# Patient Record
Sex: FEMALE | Race: WHITE | Employment: FULL TIME | URBAN - METROPOLITAN AREA
[De-identification: names, ages, dates, MRNs, and addresses within clinical notes are randomized per-mention and may not be internally consistent; named-entity substitution may affect disease eponyms.]

---

## 2020-02-09 LAB
ABO, EXTERNAL RESULT: NORMAL
HEP B, EXTERNAL RESULT: NEGATIVE
HEPATITIS C ANTIBODY, EXTERNAL RESULT: NEGATIVE
HIV, EXTERNAL RESULT: NORMAL
RPR, EXTERNAL RESULT: NORMAL
RUBELLA TITER, EXTERNAL RESULT: NORMAL

## 2020-06-09 ENCOUNTER — HOSPITAL ENCOUNTER (OUTPATIENT)
Age: 30
Discharge: HOME | End: 2020-06-09
Attending: OBSTETRICS & GYNECOLOGY | Admitting: OBSTETRICS & GYNECOLOGY
Payer: COMMERCIAL

## 2020-06-09 VITALS
TEMPERATURE: 99 F | RESPIRATION RATE: 18 BRPM | SYSTOLIC BLOOD PRESSURE: 125 MMHG | HEART RATE: 88 BPM | DIASTOLIC BLOOD PRESSURE: 67 MMHG

## 2020-06-09 LAB — RHIG LOT NUMBER: NORMAL

## 2020-06-09 PROCEDURE — 96372 THER/PROPH/DIAG INJ SC/IM: CPT

## 2020-06-09 PROCEDURE — 6360000002 HC RX W HCPCS: Performed by: OBSTETRICS & GYNECOLOGY

## 2020-06-09 RX ADMIN — HUMAN RHO(D) IMMUNE GLOBULIN 300 MCG: 300 INJECTION, SOLUTION INTRAMUSCULAR at 17:09

## 2020-06-09 NOTE — FLOWSHEET NOTE
Presents to triage for rhogam injection. Blood type results obtained from lab ghislaine and sent to blood bank.

## 2020-07-30 LAB
C. TRACHOMATIS, EXTERNAL RESULT: NEGATIVE
GBS, EXTERNAL RESULT: NEGATIVE
N. GONORRHOEAE, EXTERNAL RESULT: NEGATIVE

## 2020-08-13 ENCOUNTER — ANESTHESIA EVENT (OUTPATIENT)
Dept: LABOR AND DELIVERY | Age: 30
End: 2020-08-13
Payer: COMMERCIAL

## 2020-08-13 ENCOUNTER — ANESTHESIA (OUTPATIENT)
Dept: LABOR AND DELIVERY | Age: 30
End: 2020-08-13
Payer: COMMERCIAL

## 2020-08-13 ENCOUNTER — HOSPITAL ENCOUNTER (INPATIENT)
Age: 30
LOS: 3 days | Discharge: HOME | End: 2020-08-16
Attending: OBSTETRICS & GYNECOLOGY | Admitting: OBSTETRICS & GYNECOLOGY
Payer: COMMERCIAL

## 2020-08-13 VITALS
RESPIRATION RATE: 12 BRPM | SYSTOLIC BLOOD PRESSURE: 105 MMHG | OXYGEN SATURATION: 96 % | DIASTOLIC BLOOD PRESSURE: 58 MMHG

## 2020-08-13 PROBLEM — O41.00X0 OLIGOHYDRAMNIOS: Status: ACTIVE | Noted: 2020-08-13

## 2020-08-13 PROBLEM — O41.00X0 OLIGOHYDRAMNIOS: Status: RESOLVED | Noted: 2020-08-13 | Resolved: 2020-08-13

## 2020-08-13 PROBLEM — Z34.90 SUPERVISION OF REPEAT TERM PREGNANCY: Status: ACTIVE | Noted: 2020-08-13

## 2020-08-13 LAB
ABO/RH: NORMAL
AMPHETAMINE SCREEN, URINE: NORMAL
ANTIBODY IDENTIFICATION: NORMAL
ANTIBODY SCREEN: NORMAL
BARBITURATE SCREEN URINE: NORMAL
BENZODIAZEPINE SCREEN, URINE: NORMAL
BUPRENORPHINE URINE: NORMAL
CANNABINOID SCREEN URINE: NORMAL
COCAINE METABOLITE SCREEN URINE: NORMAL
DAT IGG CAPTURE: NORMAL
HCT VFR BLD CALC: 37.9 % (ref 36–48)
HEMOGLOBIN: 12.9 G/DL (ref 12–16)
Lab: NORMAL
MCH RBC QN AUTO: 30.5 PG (ref 26–34)
MCHC RBC AUTO-ENTMCNC: 33.9 G/DL (ref 31–36)
MCV RBC AUTO: 90.1 FL (ref 80–100)
METHADONE SCREEN, URINE: NORMAL
OPIATE SCREEN URINE: NORMAL
OXYCODONE URINE: NORMAL
PDW BLD-RTO: 14 % (ref 12.4–15.4)
PH UA: 5
PHENCYCLIDINE SCREEN URINE: NORMAL
PLATELET # BLD: 191 K/UL (ref 135–450)
PMV BLD AUTO: 8.6 FL (ref 5–10.5)
PROPOXYPHENE SCREEN: NORMAL
RBC # BLD: 4.21 M/UL (ref 4–5.2)
WBC # BLD: 9.1 K/UL (ref 4–11)

## 2020-08-13 PROCEDURE — 2580000003 HC RX 258: Performed by: OBSTETRICS & GYNECOLOGY

## 2020-08-13 PROCEDURE — 1200000000 HC SEMI PRIVATE

## 2020-08-13 PROCEDURE — 6360000002 HC RX W HCPCS: Performed by: OBSTETRICS & GYNECOLOGY

## 2020-08-13 PROCEDURE — 3700000000 HC ANESTHESIA ATTENDED CARE: Performed by: OBSTETRICS & GYNECOLOGY

## 2020-08-13 PROCEDURE — 3700000001 HC ADD 15 MINUTES (ANESTHESIA): Performed by: OBSTETRICS & GYNECOLOGY

## 2020-08-13 PROCEDURE — 86780 TREPONEMA PALLIDUM: CPT

## 2020-08-13 PROCEDURE — 6370000000 HC RX 637 (ALT 250 FOR IP): Performed by: OBSTETRICS & GYNECOLOGY

## 2020-08-13 PROCEDURE — 2500000003 HC RX 250 WO HCPCS: Performed by: OBSTETRICS & GYNECOLOGY

## 2020-08-13 PROCEDURE — 3609079900 HC CESAREAN SECTION: Performed by: OBSTETRICS & GYNECOLOGY

## 2020-08-13 PROCEDURE — 2500000003 HC RX 250 WO HCPCS: Performed by: NURSE ANESTHETIST, CERTIFIED REGISTERED

## 2020-08-13 PROCEDURE — 86850 RBC ANTIBODY SCREEN: CPT

## 2020-08-13 PROCEDURE — 85027 COMPLETE CBC AUTOMATED: CPT

## 2020-08-13 PROCEDURE — 2709999900 HC NON-CHARGEABLE SUPPLY: Performed by: OBSTETRICS & GYNECOLOGY

## 2020-08-13 PROCEDURE — 86900 BLOOD TYPING SEROLOGIC ABO: CPT

## 2020-08-13 PROCEDURE — 86880 COOMBS TEST DIRECT: CPT

## 2020-08-13 PROCEDURE — 86901 BLOOD TYPING SEROLOGIC RH(D): CPT

## 2020-08-13 PROCEDURE — 80307 DRUG TEST PRSMV CHEM ANLYZR: CPT

## 2020-08-13 PROCEDURE — 86870 RBC ANTIBODY IDENTIFICATION: CPT

## 2020-08-13 PROCEDURE — 6360000002 HC RX W HCPCS: Performed by: NURSE ANESTHETIST, CERTIFIED REGISTERED

## 2020-08-13 RX ORDER — DEXAMETHASONE SODIUM PHOSPHATE 4 MG/ML
INJECTION, SOLUTION INTRA-ARTICULAR; INTRALESIONAL; INTRAMUSCULAR; INTRAVENOUS; SOFT TISSUE PRN
Status: DISCONTINUED | OUTPATIENT
Start: 2020-08-13 | End: 2020-08-13 | Stop reason: SDUPTHER

## 2020-08-13 RX ORDER — TRISODIUM CITRATE DIHYDRATE AND CITRIC ACID MONOHYDRATE 500; 334 MG/5ML; MG/5ML
30 SOLUTION ORAL ONCE
Status: COMPLETED | OUTPATIENT
Start: 2020-08-13 | End: 2020-08-13

## 2020-08-13 RX ORDER — OXYCODONE HYDROCHLORIDE 5 MG/1
10 TABLET ORAL EVERY 4 HOURS PRN
Status: DISCONTINUED | OUTPATIENT
Start: 2020-08-13 | End: 2020-08-16 | Stop reason: HOSPADM

## 2020-08-13 RX ORDER — PHENYLEPHRINE HCL IN 0.9% NACL 1 MG/10 ML
SYRINGE (ML) INTRAVENOUS PRN
Status: DISCONTINUED | OUTPATIENT
Start: 2020-08-13 | End: 2020-08-13 | Stop reason: SDUPTHER

## 2020-08-13 RX ORDER — SODIUM CHLORIDE, SODIUM LACTATE, POTASSIUM CHLORIDE, CALCIUM CHLORIDE 600; 310; 30; 20 MG/100ML; MG/100ML; MG/100ML; MG/100ML
INJECTION, SOLUTION INTRAVENOUS CONTINUOUS
Status: DISCONTINUED | OUTPATIENT
Start: 2020-08-13 | End: 2020-08-13

## 2020-08-13 RX ORDER — SODIUM CHLORIDE 0.9 % (FLUSH) 0.9 %
10 SYRINGE (ML) INJECTION EVERY 12 HOURS SCHEDULED
Status: DISCONTINUED | OUTPATIENT
Start: 2020-08-13 | End: 2020-08-16 | Stop reason: HOSPADM

## 2020-08-13 RX ORDER — SODIUM CHLORIDE 0.9 % (FLUSH) 0.9 %
10 SYRINGE (ML) INJECTION PRN
Status: DISCONTINUED | OUTPATIENT
Start: 2020-08-13 | End: 2020-08-16 | Stop reason: HOSPADM

## 2020-08-13 RX ORDER — EPHEDRINE SULFATE/0.9% NACL/PF 50 MG/5 ML
SYRINGE (ML) INTRAVENOUS PRN
Status: DISCONTINUED | OUTPATIENT
Start: 2020-08-13 | End: 2020-08-13 | Stop reason: SDUPTHER

## 2020-08-13 RX ORDER — MORPHINE SULFATE 1 MG/ML
INJECTION, SOLUTION EPIDURAL; INTRATHECAL; INTRAVENOUS PRN
Status: DISCONTINUED | OUTPATIENT
Start: 2020-08-13 | End: 2020-08-13 | Stop reason: SDUPTHER

## 2020-08-13 RX ORDER — MODIFIED LANOLIN
OINTMENT (GRAM) TOPICAL
Status: DISCONTINUED | OUTPATIENT
Start: 2020-08-13 | End: 2020-08-16 | Stop reason: HOSPADM

## 2020-08-13 RX ORDER — SODIUM CHLORIDE, SODIUM LACTATE, POTASSIUM CHLORIDE, CALCIUM CHLORIDE 600; 310; 30; 20 MG/100ML; MG/100ML; MG/100ML; MG/100ML
INJECTION, SOLUTION INTRAVENOUS CONTINUOUS
Status: DISCONTINUED | OUTPATIENT
Start: 2020-08-13 | End: 2020-08-16 | Stop reason: HOSPADM

## 2020-08-13 RX ORDER — GLYCOPYRROLATE 0.2 MG/ML
INJECTION INTRAMUSCULAR; INTRAVENOUS PRN
Status: DISCONTINUED | OUTPATIENT
Start: 2020-08-13 | End: 2020-08-13 | Stop reason: SDUPTHER

## 2020-08-13 RX ORDER — DOCUSATE SODIUM 100 MG/1
100 CAPSULE, LIQUID FILLED ORAL 2 TIMES DAILY
COMMUNITY

## 2020-08-13 RX ORDER — ONDANSETRON 2 MG/ML
4 INJECTION INTRAMUSCULAR; INTRAVENOUS EVERY 6 HOURS PRN
Status: DISCONTINUED | OUTPATIENT
Start: 2020-08-13 | End: 2020-08-16 | Stop reason: HOSPADM

## 2020-08-13 RX ORDER — SODIUM CHLORIDE 0.9 % (FLUSH) 0.9 %
10 SYRINGE (ML) INJECTION PRN
Status: DISCONTINUED | OUTPATIENT
Start: 2020-08-13 | End: 2020-08-13

## 2020-08-13 RX ORDER — NALOXONE HYDROCHLORIDE 0.4 MG/ML
0.4 INJECTION, SOLUTION INTRAMUSCULAR; INTRAVENOUS; SUBCUTANEOUS PRN
Status: DISCONTINUED | OUTPATIENT
Start: 2020-08-13 | End: 2020-08-16 | Stop reason: HOSPADM

## 2020-08-13 RX ORDER — OXYCODONE HYDROCHLORIDE 5 MG/1
5 TABLET ORAL EVERY 4 HOURS PRN
Status: DISCONTINUED | OUTPATIENT
Start: 2020-08-13 | End: 2020-08-16 | Stop reason: HOSPADM

## 2020-08-13 RX ORDER — IBUPROFEN 800 MG/1
800 TABLET ORAL EVERY 8 HOURS SCHEDULED
Status: DISCONTINUED | OUTPATIENT
Start: 2020-08-13 | End: 2020-08-16 | Stop reason: HOSPADM

## 2020-08-13 RX ORDER — MIDAZOLAM HYDROCHLORIDE 1 MG/ML
INJECTION INTRAMUSCULAR; INTRAVENOUS PRN
Status: DISCONTINUED | OUTPATIENT
Start: 2020-08-13 | End: 2020-08-13 | Stop reason: SDUPTHER

## 2020-08-13 RX ORDER — ACETAMINOPHEN 500 MG
1000 TABLET ORAL ONCE
Status: COMPLETED | OUTPATIENT
Start: 2020-08-13 | End: 2020-08-13

## 2020-08-13 RX ORDER — METOCLOPRAMIDE HYDROCHLORIDE 5 MG/ML
10 INJECTION INTRAMUSCULAR; INTRAVENOUS ONCE
Status: COMPLETED | OUTPATIENT
Start: 2020-08-13 | End: 2020-08-13

## 2020-08-13 RX ORDER — DOCUSATE SODIUM 100 MG/1
100 CAPSULE, LIQUID FILLED ORAL 2 TIMES DAILY
Status: DISCONTINUED | OUTPATIENT
Start: 2020-08-13 | End: 2020-08-16 | Stop reason: HOSPADM

## 2020-08-13 RX ORDER — SODIUM CHLORIDE 0.9 % (FLUSH) 0.9 %
10 SYRINGE (ML) INJECTION EVERY 12 HOURS SCHEDULED
Status: DISCONTINUED | OUTPATIENT
Start: 2020-08-13 | End: 2020-08-13

## 2020-08-13 RX ORDER — ACETAMINOPHEN 500 MG
1000 TABLET ORAL EVERY 8 HOURS SCHEDULED
Status: DISCONTINUED | OUTPATIENT
Start: 2020-08-13 | End: 2020-08-16 | Stop reason: HOSPADM

## 2020-08-13 RX ORDER — KETOROLAC TROMETHAMINE 30 MG/ML
INJECTION, SOLUTION INTRAMUSCULAR; INTRAVENOUS PRN
Status: DISCONTINUED | OUTPATIENT
Start: 2020-08-13 | End: 2020-08-13 | Stop reason: SDUPTHER

## 2020-08-13 RX ORDER — SODIUM CHLORIDE, SODIUM LACTATE, POTASSIUM CHLORIDE, AND CALCIUM CHLORIDE .6; .31; .03; .02 G/100ML; G/100ML; G/100ML; G/100ML
1000 INJECTION, SOLUTION INTRAVENOUS ONCE
Status: COMPLETED | OUTPATIENT
Start: 2020-08-13 | End: 2020-08-13

## 2020-08-13 RX ORDER — ONDANSETRON 2 MG/ML
INJECTION INTRAMUSCULAR; INTRAVENOUS PRN
Status: DISCONTINUED | OUTPATIENT
Start: 2020-08-13 | End: 2020-08-13 | Stop reason: SDUPTHER

## 2020-08-13 RX ORDER — BUPIVACAINE HYDROCHLORIDE 7.5 MG/ML
INJECTION, SOLUTION INTRASPINAL PRN
Status: DISCONTINUED | OUTPATIENT
Start: 2020-08-13 | End: 2020-08-13 | Stop reason: SDUPTHER

## 2020-08-13 RX ORDER — KETOROLAC TROMETHAMINE 30 MG/ML
30 INJECTION, SOLUTION INTRAMUSCULAR; INTRAVENOUS EVERY 8 HOURS
Status: DISCONTINUED | OUTPATIENT
Start: 2020-08-13 | End: 2020-08-16 | Stop reason: HOSPADM

## 2020-08-13 RX ORDER — NALBUPHINE HCL 10 MG/ML
10 AMPUL (ML) INJECTION EVERY 4 HOURS PRN
Status: DISCONTINUED | OUTPATIENT
Start: 2020-08-13 | End: 2020-08-16 | Stop reason: HOSPADM

## 2020-08-13 RX ADMIN — Medication 10 MG: at 08:31

## 2020-08-13 RX ADMIN — ONDANSETRON 4 MG: 2 INJECTION INTRAMUSCULAR; INTRAVENOUS at 08:28

## 2020-08-13 RX ADMIN — Medication 200 MCG: at 08:32

## 2020-08-13 RX ADMIN — DEXAMETHASONE SODIUM PHOSPHATE 4 MG: 4 INJECTION, SOLUTION INTRAMUSCULAR; INTRAVENOUS at 08:28

## 2020-08-13 RX ADMIN — ACETAMINOPHEN 1000 MG: 500 TABLET, FILM COATED ORAL at 14:38

## 2020-08-13 RX ADMIN — SODIUM CHLORIDE, POTASSIUM CHLORIDE, SODIUM LACTATE AND CALCIUM CHLORIDE 1000 ML: 600; 310; 30; 20 INJECTION, SOLUTION INTRAVENOUS at 07:11

## 2020-08-13 RX ADMIN — KETOROLAC TROMETHAMINE 30 MG: 30 INJECTION, SOLUTION INTRAMUSCULAR; INTRAVENOUS at 08:51

## 2020-08-13 RX ADMIN — DOCUSATE SODIUM 100 MG: 100 CAPSULE ORAL at 21:00

## 2020-08-13 RX ADMIN — ACETAMINOPHEN 1000 MG: 500 TABLET, FILM COATED ORAL at 07:10

## 2020-08-13 RX ADMIN — KETOROLAC TROMETHAMINE 30 MG: 30 INJECTION, SOLUTION INTRAMUSCULAR at 18:13

## 2020-08-13 RX ADMIN — BUPIVACAINE HYDROCHLORIDE 1.5 ML: 7.5 INJECTION, SOLUTION INTRASPINAL at 08:22

## 2020-08-13 RX ADMIN — ACETAMINOPHEN 1000 MG: 500 TABLET, FILM COATED ORAL at 22:13

## 2020-08-13 RX ADMIN — Medication 200 MCG: at 08:26

## 2020-08-13 RX ADMIN — Medication 10 MG: at 08:35

## 2020-08-13 RX ADMIN — ONDANSETRON 4 MG: 2 INJECTION INTRAMUSCULAR; INTRAVENOUS at 15:12

## 2020-08-13 RX ADMIN — FAMOTIDINE 20 MG: 10 INJECTION, SOLUTION INTRAVENOUS at 07:09

## 2020-08-13 RX ADMIN — CEFAZOLIN 2 G: 10 INJECTION, POWDER, FOR SOLUTION INTRAVENOUS at 07:38

## 2020-08-13 RX ADMIN — MIDAZOLAM 2 MG: 1 INJECTION INTRAMUSCULAR; INTRAVENOUS at 08:42

## 2020-08-13 RX ADMIN — SODIUM CITRATE AND CITRIC ACID MONOHYDRATE 30 ML: 500; 334 SOLUTION ORAL at 08:01

## 2020-08-13 RX ADMIN — MORPHINE SULFATE 0.2 MG: 1 INJECTION EPIDURAL; INTRATHECAL; INTRAVENOUS at 08:22

## 2020-08-13 RX ADMIN — SODIUM CHLORIDE, POTASSIUM CHLORIDE, SODIUM LACTATE AND CALCIUM CHLORIDE: 600; 310; 30; 20 INJECTION, SOLUTION INTRAVENOUS at 07:40

## 2020-08-13 RX ADMIN — SODIUM CHLORIDE, POTASSIUM CHLORIDE, SODIUM LACTATE AND CALCIUM CHLORIDE: 600; 310; 30; 20 INJECTION, SOLUTION INTRAVENOUS at 21:00

## 2020-08-13 RX ADMIN — METOCLOPRAMIDE 10 MG: 5 INJECTION, SOLUTION INTRAMUSCULAR; INTRAVENOUS at 07:10

## 2020-08-13 RX ADMIN — SODIUM CHLORIDE, POTASSIUM CHLORIDE, SODIUM LACTATE AND CALCIUM CHLORIDE: 600; 310; 30; 20 INJECTION, SOLUTION INTRAVENOUS at 12:53

## 2020-08-13 RX ADMIN — OXYTOCIN 999 ML/HR: 10 INJECTION INTRAVENOUS at 08:38

## 2020-08-13 RX ADMIN — Medication 10 ML: at 18:12

## 2020-08-13 RX ADMIN — GLYCOPYRROLATE 0.2 MG: 0.2 INJECTION, SOLUTION INTRAMUSCULAR; INTRAVENOUS at 08:29

## 2020-08-13 RX ADMIN — Medication 10 ML: at 15:12

## 2020-08-13 ASSESSMENT — PULMONARY FUNCTION TESTS
PIF_VALUE: 1
PIF_VALUE: 0
PIF_VALUE: 1
PIF_VALUE: 1
PIF_VALUE: 0
PIF_VALUE: 1
PIF_VALUE: 0
PIF_VALUE: 0
PIF_VALUE: 1
PIF_VALUE: 0
PIF_VALUE: 1
PIF_VALUE: 0
PIF_VALUE: 1
PIF_VALUE: 1
PIF_VALUE: 0
PIF_VALUE: 0
PIF_VALUE: 1
PIF_VALUE: 1
PIF_VALUE: 0
PIF_VALUE: 0
PIF_VALUE: 1
PIF_VALUE: 0
PIF_VALUE: 1
PIF_VALUE: 1
PIF_VALUE: 0
PIF_VALUE: 1
PIF_VALUE: 0
PIF_VALUE: 0
PIF_VALUE: 1
PIF_VALUE: 0
PIF_VALUE: 1

## 2020-08-13 ASSESSMENT — PAIN SCALES - GENERAL
PAINLEVEL_OUTOF10: 1
PAINLEVEL_OUTOF10: 0

## 2020-08-13 ASSESSMENT — LIFESTYLE VARIABLES: SMOKING_STATUS: 0

## 2020-08-13 NOTE — OP NOTE
Obstetrics  Section Operative Report           Pre-operative Diagnosis:  breech and oligohydramnios with decreased fetal movement    Post-operative Diagnosis:  Same    Procedure:  primary low transverse  section    Surgeon:   Angelika Bradley MD     Assistant(s):       Anesthesia:  Spinal anesthesia    Indications: This is a 26 y.o.   OB History        3    Para   2    Term   2            AB        Living   2       SAB        TAB        Ectopic        Molar        Multiple        Live Births   2              with  Estimated Date of Delivery: 20 presents for scheduled primary  section. TECHNIQUE:  The patient was brought to the operating room and following the placement of adequate Spinal Anesthesia, the patient was placed in dorsal supine position and wedged to the left. A Sargent catheter was placed in the patient's bladder and Wagner pumps were placed on her lower extremities for DVT prophylaxis. Following this, the patient was prepped and draped in the usual sterile manner for a  section. A Pfannenstiel incision was made in the skin with a knife and extended down to the fascia with sharp and blunt dissection. The fascia was then incised in the midline with the knife. The fascial incision was extended bilaterally using curved Alvarez scissors. Fascia was then bluntly and sharply dissected off underlying muscles and then the muscle was sharply and bluntly dissected in the midline. The peritoneum was entered in it's most cephalad area and the peritoneal layer was noted to be free of any signs of bowel or bladder adhesions. The inferior leaf of the peritoneum is transilluminated as the incision was extended superiorly and inferiorly, taking care to avoid the superior dome of the bladder. The bladder blade was inserted and the bladder flap was formed sharply and then bluntly dissected from the lower uterine segment.   The bladder blade was removed and re-inserted allowing for an inferior retraction of the bladder. A low transverse uterine incision was then made with a scalpel and extended bilaterally with digital traction. Upon entering the uterine cavity, a moderate amount of Clearfluid was noted. The infant was then delivered atraumatically from a breech sacrum anterior presentation without incident. Upon delivery of the infant, nose and mouth were bulb suctioned. After an appropriate delay, the cord was clamped and cut and the infant was handed off the sterile table in a routine manner. Cord gases were collected. Cord blood was collected. The placenta was expressed and delivers without complications and the uterus was cleansed of any remaining products of conception. The uterus is then exteriorized, wrapped in a wet laparotomy sponge and wiped dry with a dry lap. The uterine incision was then closed in two layers using 0-Monocryl suture. The first layer was a running locking stitch and the second layer was a running imbricating stitch. The  incision was re-examined and noted to be hemostatic and the uterus is placed back into the abdominal cavity. Clots were removed and both gutters are liberally irrigated with warm normal saline. The peritoneum was closed using a running suture of 3-0 Vicryl. The fascia was closed using a two running sutures of 0-Vicryl beginning laterally and meeting in the midline. The subcutaneous layer was reapproximated with interrupted sutures of 3-0 plain in an inverted mattress fashion. Skin was re-approximated with running 4-0 Monocryl subcuticular stitch. All sponge, instrument and  needle counts are correct at the end of the procedure. The patient tolerated the procedure well and went to the recovery room in stable condition. The wound is dressed with Telfa and Tegaderm.      Antibiotics: Ancef 2 grams IV    Tubes, uterus, ovaries:  Normal appearing uterus tubes, and ovaries in the post partum state withoutsignificant findings.       Estimated blood loss:  800ml    Specimens:  None    Complications:  none    Baby:     Information for the patient's :  Peggy Burks [[id number]]   APGAR One: 9     Information for the patient's :  Peggy Burks [[id number]]   APGAR Five: 9     Information for the patient's :  Peggy Burks [[id number]]   Birth Weight: 6 lb 12.3 oz (3.07 kg)       Condition:    Infant stable to recovery with mother  Mother stable, transfer to labor & delivery room [address]  20

## 2020-08-13 NOTE — H&P
Obstetrics and Gynecology   Obstetrics History and Physical        CHIEF COMPLAINT:  referral from antepartum for oligohydroamniosis, and breech presentation at term with decreased fetal movement    HISTORY OF PRESENT ILLNESS:      The patient is a 26 y.o. female at 38w7d. OB History        3    Para   2    Term   2            AB        Living   2       SAB        TAB        Ectopic        Molar        Multiple        Live Births   2            Patient presents with a chief complaint as above and is being admitted for   without tubal ligation. The patient does express a strong desire for natural childbirth and she is consented for this choice at each office visit. Estimated Due Date: Estimated Date of Delivery: 20    PRENATAL CARE:    Complicated by: none  Patient Active Problem List:     Normal labor and delivery     Breech presentation     Oligohydramnios     Supervision of repeat term pregnancy        PAST OB HISTORY:  OB History        3    Para   2    Term   2            AB        Living   2       SAB        TAB        Ectopic        Molar        Multiple        Live Births   2                Past Medical History:    History reviewed. No pertinent past medical history.   Past Surgical History:        Procedure Laterality Date    OTHER SURGICAL HISTORY      RIB SURGERY AT 1YEARS OLD     Allergies:  Sulfa antibiotics    Social History:    Social History     Socioeconomic History    Marital status:      Spouse name: Not on file    Number of children: Not on file    Years of education: Not on file    Highest education level: Not on file   Occupational History    Not on file   Social Needs    Financial resource strain: Not on file    Food insecurity     Worry: Not on file     Inability: Not on file    Transportation needs     Medical: Not on file     Non-medical: Not on file   Tobacco Use    Smoking status: Never Smoker    Smokeless tobacco: Never Used Substance and Sexual Activity    Alcohol use: Not Currently    Drug use: Never    Sexual activity: Not on file   Lifestyle    Physical activity     Days per week: Not on file     Minutes per session: Not on file    Stress: Not on file   Relationships    Social connections     Talks on phone: Not on file     Gets together: Not on file     Attends Faith service: Not on file     Active member of club or organization: Not on file     Attends meetings of clubs or organizations: Not on file     Relationship status: Not on file    Intimate partner violence     Fear of current or ex partner: Not on file     Emotionally abused: Not on file     Physically abused: Not on file     Forced sexual activity: Not on file   Other Topics Concern    Not on file   Social History Narrative    Not on file     Family History:       Problem Relation Age of Onset    Arthritis Mother     Depression Mother     Hearing Loss Father     Arthritis Maternal Grandmother     Breast Cancer Maternal Grandmother     Depression Maternal Grandmother     Diabetes Maternal Grandfather     Heart Attack Maternal Grandfather     Heart Disease Maternal Grandfather     High Blood Pressure Maternal Grandfather     High Cholesterol Maternal Grandfather     Breast Cancer Paternal Grandmother      Medications Prior to Admission:  Medications Prior to Admission: Prenatal Vit-Fe Fumarate-FA (PRENATAL 1+1 PO), Take 1 tablet by mouth  Cetirizine HCl 10 MG CAPS, Take 1 tablet by mouth  docusate sodium (COLACE) 100 MG capsule, Take 100 mg by mouth 2 times daily    REVIEW OF SYSTEMS:  Denies any of the following:  fever/chills, headache, visual changes, chest pain, shortness of breath, nausea/vomiting/diarrhea, bruising and rash    PHYSICAL EXAM:  Vitals:    08/13/20 0647   BP: 115/79   Pulse: 81   Resp: 16   Temp: 98.2 °F (36.8 °C)   TempSrc: Oral   Weight: 193 lb (87.5 kg)   Height: 5' (1.524 m)     General appearance:  awake, alert, cooperative, no apparent distress, and appears stated age  Neurologic:  Awake, alert, oriented to name, place and time. Lungs:  No increased work of breathing, good air exchange  Abdomen:  Soft, non tender, gravid, consistent with her gestational age,   EFW:  by external exam was  appropriate for gestational age  Fetal heart rate:  Reassuring. FETAL SURVEILLANCE TESTING SUMMARY  INDICATIONS:  Early labor evaluation. OBJECTIVE RESULTS:  Fetal heart variability: moderate  Fetal Heart Rate decelerations: none  Fetal Heart Rate accelerations: yes  Baseline FHR: 125 per minute  Uterine contractions: irregular, every 20-30 minutes  Fetal surveillance: reassuring, Category 1  Pelvis:  Adequate pelvis  Fetal position: Cephalic    CERVIX: At admission in triage:                         Membranes:  Intact    Labs:   CBC:   Lab Results   Component Value Date    WBC 9.1 08/13/2020    RBC 4.21 08/13/2020    HGB 12.9 08/13/2020    HCT 37.9 08/13/2020    MCV 90.1 08/13/2020    MCH 30.5 08/13/2020    MCHC 33.9 08/13/2020    RDW 14.0 08/13/2020     08/13/2020    MPV 8.6 08/13/2020       ASSESSMENT AND PLAN:    Labor: Admit, anticipate normal delivery, routine labor orders  Fetus: Reassuring  GBS: No  Other: Supportive care for this patient at term with desire for natural childbirth and with mom and baby doing well at this time. Prior to our procedure, this patient is given all risk versus benefits of the planned procedure and she voices an understanding of the planned procedure, asks appropriate questions that are all answered to her satisfaction and she then elects to proceed.

## 2020-08-13 NOTE — ANESTHESIA PRE PROCEDURE
History:    Social History     Tobacco Use    Smoking status: Never Smoker    Smokeless tobacco: Never Used   Substance Use Topics    Alcohol use: Not Currently                                Counseling given: Not Answered      Vital Signs (Current):   Vitals:    08/13/20 0647   BP: 115/79   Pulse: 81   Resp: 16   Temp: 36.8 °C (98.2 °F)   TempSrc: Oral   Weight: 193 lb (87.5 kg)   Height: 5' (1.524 m)                                              BP Readings from Last 3 Encounters:   08/13/20 115/79   06/09/20 125/67       NPO Status: Time of last liquid consumption: 2200                        Time of last solid consumption: 2000                        Date of last liquid consumption: 08/12/20                        Date of last solid food consumption: 08/12/20    BMI:   Wt Readings from Last 3 Encounters:   08/13/20 193 lb (87.5 kg)     Body mass index is 37.69 kg/m². CBC:   Lab Results   Component Value Date    WBC 9.1 08/13/2020    RBC 4.21 08/13/2020    HGB 12.9 08/13/2020    HCT 37.9 08/13/2020    MCV 90.1 08/13/2020    RDW 14.0 08/13/2020     08/13/2020       CMP: No results found for: NA, K, CL, CO2, BUN, CREATININE, GFRAA, AGRATIO, LABGLOM, GLUCOSE, PROT, CALCIUM, BILITOT, ALKPHOS, AST, ALT    POC Tests: No results for input(s): POCGLU, POCNA, POCK, POCCL, POCBUN, POCHEMO, POCHCT in the last 72 hours.     Coags: No results found for: PROTIME, INR, APTT    HCG (If Applicable): No results found for: PREGTESTUR, PREGSERUM, HCG, HCGQUANT     ABGs: No results found for: PHART, PO2ART, RZH8CYX, EYP0JLC, BEART, P8IHCVGY     Type & Screen (If Applicable):  No results found for: LABABO, LABRH    Drug/Infectious Status (If Applicable):  No results found for: HIV, HEPCAB    COVID-19 Screening (If Applicable): No results found for: COVID19      Anesthesia Evaluation  Patient summary reviewed and Nursing notes reviewed no history of anesthetic complications:   Airway: Mallampati: II  TM distance: >3 FB Neck ROM: full  Mouth opening: > = 3 FB Dental: normal exam         Pulmonary:Negative Pulmonary ROS and normal exam        (-) not a current smoker                           Cardiovascular:Negative CV ROS             Beta Blocker:  Not on Beta Blocker         Neuro/Psych:   Negative Neuro/Psych ROS              GI/Hepatic/Renal:   (+) GERD: no interval change,           Endo/Other: Negative Endo/Other ROS                    Abdominal:           Vascular: negative vascular ROS. Anesthesia Plan      spinal     ASA 2     (Height: 5' (1.524 m), Weight: 193 lb (87.5 kg), BP: 115/79  Lab Results       Component                Value               Date                       WBC                      9.1                 08/13/2020                 HGB                      12.9                08/13/2020                 HCT                      37.9                08/13/2020                 MCV                      90.1                08/13/2020                 PLT                      191                 08/13/2020            Discussed spinal procedure and risks including but not limited to infection, changes in VS, n/v, severe headache, paresthesia, intravascular injection, rare temporary or permanent injury and failed block with GETA back up. Discussed the addition of duramorph to the spinal.  Patient understands the side effects of duramorph and agrees to its utilization.)        Anesthetic plan and risks discussed with patient. Use of blood products discussed with patient whom consented to blood products. Plan discussed with CRNA.     Attending anesthesiologist reviewed and agrees with [id number] [facility] [address], APRN - CRNA   8/13/2020

## 2020-08-13 NOTE — PLAN OF CARE
Problem: Fluid Volume - Imbalance:  Goal: Absence of postpartum hemorrhage signs and symptoms  Description: Absence of postpartum hemorrhage signs and symptoms  Outcome: Ongoing     Problem: Infection - Surgical Site:  Goal: Will show no infection signs and symptoms  Description: Will show no infection signs and symptoms  Outcome: Ongoing     Problem: Mood - Altered:  Goal: Mood stable  Description: Mood stable  Outcome: Ongoing     Problem: Pain - Acute:  Goal: Pain level will decrease  Description: Pain level will decrease  Outcome: Ongoing     Problem: Venous Thromboembolism:  Goal: Will show no signs or symptoms of venous thromboembolism  Description: Will show no signs or symptoms of venous thromboembolism  Outcome: Ongoing

## 2020-08-13 NOTE — L&D DELIVERY NOTE
Mother's Information    Mother Delivery Information    Surgical or Additional Est. Blood Loss (mL):  700 (View Only):  Edit in Flowsheets   Combined Est. Blood Loss (mL):  700        Randy Lui [[id number]]    Labor Events    Cervical ripening date/time:     Rupture date/time:             Anesthesia    Method:  Spinal     Assisted Delivery Details    Forceps attempted?:  No  Vacuum extractor attempted?:  No     Document Additional Attempt       Document Additional Attempt             Shoulder Dystocia    Shoulder dystocia present?:  No  Add Second Maneuver  Add Third Maneuver  Add Fourth Maneuver  Add Fifth Maneuver  Add Sixth Maneuver  Add Seventh Maneuver  Add Eighth Maneuver  Add Ninth Maneuver      Presentation    Presentation:  Breech     [place] Information    Head delivery date/time:  2020 08:37:00   Changing the 's delivery date/time could affect patient care.:     Delivery date/time:   20 6734   Delivery type:  , Low Transverse    Details:   Trial of labor?:  No    categorization:  Primary    priority:  Scheduled   Indications for :  Breech   Skin Incision Type:  Low Transverse   Uterine Incision:  Low Transverse         Delivery Providers    Delivering clinician:  Terrence Beard MD     Cord    Vessels:  3 Vessels  Complications:  None  Delayed cord clamping?:  Yes  Cord clamped date/time:  2020 0836  Cord blood disposition:  Refrigerator  Gases sent?:  Yes  Stem cell collection (by provider):   No     Placenta    Date/time:  2020 08:38:00  Removal:  Manual Removal  Appearance:  Intact  Disposition:  Refrigerator     Delivery Resuscitation    Method:  Bulb Suction     Apgars    Living status:  Living  Apgars   1 Minute:   5 Minute:   10 Minute 15 Minute 20 Minute   Skin Color: 1  1       Heart Rate: 2  2       Reflex Irritability: 2  2       Muscle Tone: 2  2       Respiratory Effort: 2  2       Total: 9  9 Apgars Assigned By:  Holly Sagastume     [place] Measurements    Weight:  3070 g Length:  53 cm   Head circumference:  34 cm       Delivery Information    Surgical or additional est. blood loss (mL):  700 (View Only):  Edit in Flowsheets   Combined est. blood loss (mL):  700     Other Procedures    Procedures:  None        see operative report for details

## 2020-08-13 NOTE — PLAN OF CARE
Problem: Anxiety:  Goal: Level of anxiety will decrease  Description: Level of anxiety will decrease  Outcome: Ongoing     Problem: Aspiration - Risk of:  Goal: Absence of aspiration  Description: Absence of aspiration  Outcome: Ongoing     Problem: Tissue Perfusion - Uteroplacental, Altered:  Description: [TRUNCATED] For intrapartum patients with recurrent variable decelerations of the fetal heart rate, consider transcervical amnioinfusion. - For patients in labor, avoid prophylactic use of continuous maternal oxygen supplementation to prevent nonreas . Stephany Jacobs [TRUNCATED] For intrapartum patients with recurrent variable decelerations of the fetal heart rate, consider transcervical amnioinfusion. - For patients in labor, avoid prophylactic use of continuous maternal oxygen supplementation to prevent nonreas . Stephany Jacobs [TRUNCATED] For intrapartum patients with recurrent variable decelerations of the fetal heart rate, consider transcervical amnioinfusion. - For patients in labor, avoid prophylactic use of continuous maternal oxygen supplementation to prevent nonreas . Stephany Jacobs [TRUNCATED] For intrapartum patients with recurrent variable decelerations of the fetal heart rate, consider transcervical amnioinfusion. - For patients in labor, avoid prophylactic use of continuous maternal oxygen supplementation to prevent nonreas . Stephany Jacobs [TRUNCATED] For intrapartum patients with recurrent variable decelerations of the fetal heart rate, consider transcervical amnioinfusion. - For patients in labor, avoid prophylactic use of continuous maternal oxygen supplementation to prevent nonreas . ..   Goal: Absence of abnormal fetal heart rate pattern  Description: Absence of abnormal fetal heart rate pattern  Outcome: Ongoing     Problem: Venous Thromboembolism - Risk of:  Goal: Will show no signs or symptoms of venous thromboembolism  Description: Will show no signs or symptoms of venous thromboembolism  Outcome: Ongoing

## 2020-08-14 LAB
ABO/RH: NORMAL
FETAL SCREEN: NORMAL
HCT VFR BLD CALC: 30.6 % (ref 36–48)
HEMOGLOBIN: 10.2 G/DL (ref 12–16)
MCH RBC QN AUTO: 30.3 PG (ref 26–34)
MCHC RBC AUTO-ENTMCNC: 33.4 G/DL (ref 31–36)
MCV RBC AUTO: 90.7 FL (ref 80–100)
PDW BLD-RTO: 13.9 % (ref 12.4–15.4)
PLATELET # BLD: 156 K/UL (ref 135–450)
PMV BLD AUTO: 8.4 FL (ref 5–10.5)
RBC # BLD: 3.37 M/UL (ref 4–5.2)
RHIG LOT NUMBER: NORMAL
TOTAL SYPHILLIS IGG/IGM: NORMAL
WBC # BLD: 12 K/UL (ref 4–11)

## 2020-08-14 PROCEDURE — 6360000002 HC RX W HCPCS: Performed by: OBSTETRICS & GYNECOLOGY

## 2020-08-14 PROCEDURE — 85461 HEMOGLOBIN FETAL: CPT

## 2020-08-14 PROCEDURE — 86900 BLOOD TYPING SEROLOGIC ABO: CPT

## 2020-08-14 PROCEDURE — 2580000003 HC RX 258: Performed by: OBSTETRICS & GYNECOLOGY

## 2020-08-14 PROCEDURE — 85027 COMPLETE CBC AUTOMATED: CPT

## 2020-08-14 PROCEDURE — 86901 BLOOD TYPING SEROLOGIC RH(D): CPT

## 2020-08-14 PROCEDURE — 6370000000 HC RX 637 (ALT 250 FOR IP): Performed by: OBSTETRICS & GYNECOLOGY

## 2020-08-14 PROCEDURE — 96372 THER/PROPH/DIAG INJ SC/IM: CPT

## 2020-08-14 PROCEDURE — 1200000000 HC SEMI PRIVATE

## 2020-08-14 RX ORDER — SIMETHICONE 80 MG
80 TABLET,CHEWABLE ORAL EVERY 6 HOURS PRN
Status: DISCONTINUED | OUTPATIENT
Start: 2020-08-14 | End: 2020-08-16 | Stop reason: HOSPADM

## 2020-08-14 RX ADMIN — ACETAMINOPHEN 1000 MG: 500 TABLET, FILM COATED ORAL at 16:48

## 2020-08-14 RX ADMIN — SIMETHICONE 80 MG: 80 TABLET, CHEWABLE ORAL at 20:24

## 2020-08-14 RX ADMIN — Medication 10 ML: at 08:45

## 2020-08-14 RX ADMIN — Medication 10 ML: at 19:41

## 2020-08-14 RX ADMIN — KETOROLAC TROMETHAMINE 30 MG: 30 INJECTION, SOLUTION INTRAMUSCULAR at 01:58

## 2020-08-14 RX ADMIN — ACETAMINOPHEN 1000 MG: 500 TABLET, FILM COATED ORAL at 08:45

## 2020-08-14 RX ADMIN — DOCUSATE SODIUM 100 MG: 100 CAPSULE ORAL at 08:45

## 2020-08-14 RX ADMIN — DOCUSATE SODIUM 100 MG: 100 CAPSULE ORAL at 19:40

## 2020-08-14 RX ADMIN — IBUPROFEN 800 MG: 800 TABLET, FILM COATED ORAL at 10:29

## 2020-08-14 RX ADMIN — HUMAN RHO(D) IMMUNE GLOBULIN 300 MCG: 300 INJECTION, SOLUTION INTRAMUSCULAR at 21:12

## 2020-08-14 RX ADMIN — IBUPROFEN 800 MG: 800 TABLET, FILM COATED ORAL at 18:00

## 2020-08-14 ASSESSMENT — PAIN SCALES - GENERAL
PAINLEVEL_OUTOF10: 4
PAINLEVEL_OUTOF10: 4
PAINLEVEL_OUTOF10: 2
PAINLEVEL_OUTOF10: 0
PAINLEVEL_OUTOF10: 0

## 2020-08-14 NOTE — ANESTHESIA POSTPROCEDURE EVALUATION
Department of Anesthesiology  Postprocedure Note    Patient: Jatinder Henson  MRN: [id number]  YOB: 1990  Date of evaluation: 2020  Time:  8:18 AM     Procedure Summary     Date:  20 Room / Location:  [facility]    Anesthesia Start:  7754 Anesthesia Stop:  3567    Procedure:   SECTION (N/A Abdomen) Diagnosis:       Breech presentation      (Breech/Oligohydramnios)    Surgeon:  Sobeida Travis MD Responsible Provider:  Mark Baxter MD    Anesthesia Type:  spinal ASA Status:  2          Anesthesia Type: spinal    Holli Phase I: Holli Score: 9    Holli Phase II: Holli Score: 10    Last vitals: Reviewed and per EMR flowsheets. Anesthesia Post Evaluation    Patient location during evaluation: bedside  Patient participation: complete - patient participated  Level of consciousness: awake and alert  Pain score: 0  Airway patency: patent  Nausea & Vomiting: no vomiting and no nausea  Complications: no  Cardiovascular status: hemodynamically stable  Respiratory status: spontaneous ventilation, room air, nonlabored ventilation and acceptable  Hydration status: stable    Patient s/p Spinal for C/S. Pt denies residual numbness post block. Patient is ambulating and voiding without difficulty. Patient denies back pain, headache, paresthesias, pruritus or n/v.  Spinal site is free of signs of infection.

## 2020-08-14 NOTE — PROGRESS NOTES
Obstetrics Postpartum  Section Note    Subjective:     Postpartum Day 1:  Delivery    The patient feels well. The patient denies emotional concerns. Pain is well controlled with current medications. The baby iswell. Baby is feeding via breast. Urinary output is adequate. The patient is ambulating well. The patient is tolerating a normal diet. Flatus admits to been passed. She is noting significant gas pains which she reports is unexpected. Objective:      Patient Vitals for the past 8 hrs:   BP Temp Temp src Pulse Resp SpO2   20 1645 (!) 95/54 97.3 °F (36.3 °C) Oral 75 18 99 %     General:    alert, appears stated age and cooperative   Bowel Sounds:  active   Lochia:  appropriate and patient notes minimal   Uterine Fundus:   firm, not tender, U=-2   Incision: Dressing in place and without signs of saturation   DVT Evaluation:  No evidence of DVT seen on physical exam.     Assessment:     Status post  section. Doing well postoperatively. Plan:     Continue current care.

## 2020-08-15 PROBLEM — Z34.90 SUPERVISION OF REPEAT TERM PREGNANCY: Status: RESOLVED | Noted: 2020-08-13 | Resolved: 2020-08-15

## 2020-08-15 PROCEDURE — 6370000000 HC RX 637 (ALT 250 FOR IP): Performed by: OBSTETRICS & GYNECOLOGY

## 2020-08-15 PROCEDURE — 1200000000 HC SEMI PRIVATE

## 2020-08-15 RX ADMIN — ACETAMINOPHEN 1000 MG: 500 TABLET, FILM COATED ORAL at 21:53

## 2020-08-15 RX ADMIN — DOCUSATE SODIUM 100 MG: 100 CAPSULE ORAL at 09:42

## 2020-08-15 RX ADMIN — IBUPROFEN 800 MG: 800 TABLET, FILM COATED ORAL at 02:00

## 2020-08-15 RX ADMIN — DOCUSATE SODIUM 100 MG: 100 CAPSULE ORAL at 21:53

## 2020-08-15 RX ADMIN — IBUPROFEN 800 MG: 800 TABLET, FILM COATED ORAL at 17:54

## 2020-08-15 RX ADMIN — IBUPROFEN 800 MG: 800 TABLET, FILM COATED ORAL at 09:42

## 2020-08-15 RX ADMIN — ACETAMINOPHEN 1000 MG: 500 TABLET, FILM COATED ORAL at 13:48

## 2020-08-15 RX ADMIN — ACETAMINOPHEN 1000 MG: 500 TABLET, FILM COATED ORAL at 01:23

## 2020-08-15 ASSESSMENT — PAIN SCALES - GENERAL
PAINLEVEL_OUTOF10: 2
PAINLEVEL_OUTOF10: 1
PAINLEVEL_OUTOF10: 3
PAINLEVEL_OUTOF10: 2
PAINLEVEL_OUTOF10: 2
PAINLEVEL_OUTOF10: 4

## 2020-08-15 NOTE — FLOWSHEET NOTE
Bedside handoff completed. All questions answered. Orders reviewed. Patient included in discussion regarding plan of care.  Report given to Tamiko LIRA

## 2020-08-15 NOTE — FLOWSHEET NOTE
Incisional dressing removed, incision well approximated, no odor. Scant drainage noted, adhesive bandage placed on incision loosely.

## 2020-08-15 NOTE — DISCHARGE SUMMARY
Obstetrical Discharge Form    Gestational Age: 38w7d    Antepartum complications: breech at term with oligohydramnios and decreased fetal movement    Date of Delivery:    Information for the patient's :  Mazin Cha [[id number]]   @[facility]@      Information for the patient's :  Mazin Cha [[id number]]   @[facility]       Type of Delivery:  for breech    Delivered By: Traci Gamble    Baby:      Information for the patient's :  Mazin Cha [[id number]]   APGAR One: 9     Information for the patient's :  Mazin Cha [[id number]]   APGAR Five: 9     Information for the patient's :  Mazin Cha [[id number]]   Birth Weight: 6 lb 12.3 oz (3.07 kg)       Anesthesia: Spinal    Intrapartum complications: None    Postpartum complications: none    Discharge Medication:    Carlo Hunt   Home Medication Instructions [id number]    Printed on:08/15/20 1054   Medication Information                      Cetirizine HCl 10 MG CAPS  Take 1 tablet by mouth             docusate sodium (COLACE) 100 MG capsule  Take 100 mg by mouth 2 times daily             Prenatal Vit-Fe Fumarate-FA (PRENATAL 1+1 PO)  Take 1 tablet by mouth                  Discharge Condition:  good    Discharge Date: 2020    PLAN:  Follow up in 6 weeks for routine PP visit  All questions answered  D/C summary is written for D/C planning purposes, any delay in discharge from ordered D/C date due may be due to  factors and may be dependant on pediatric orders for  discharge planning.     Traci Gamble

## 2020-08-16 VITALS
HEIGHT: 60 IN | BODY MASS INDEX: 37.89 KG/M2 | RESPIRATION RATE: 16 BRPM | SYSTOLIC BLOOD PRESSURE: 115 MMHG | WEIGHT: 193 LBS | TEMPERATURE: 97.1 F | OXYGEN SATURATION: 99 % | HEART RATE: 58 BPM | DIASTOLIC BLOOD PRESSURE: 71 MMHG

## 2020-08-16 PROCEDURE — 6370000000 HC RX 637 (ALT 250 FOR IP): Performed by: OBSTETRICS & GYNECOLOGY

## 2020-08-16 RX ADMIN — ACETAMINOPHEN 1000 MG: 500 TABLET, FILM COATED ORAL at 06:16

## 2020-08-16 RX ADMIN — IBUPROFEN 800 MG: 800 TABLET, FILM COATED ORAL at 01:55

## 2020-08-16 RX ADMIN — DOCUSATE SODIUM 100 MG: 100 CAPSULE ORAL at 09:57

## 2020-08-16 RX ADMIN — IBUPROFEN 800 MG: 800 TABLET, FILM COATED ORAL at 09:57

## 2020-08-16 ASSESSMENT — PAIN SCALES - GENERAL
PAINLEVEL_OUTOF10: 0
PAINLEVEL_OUTOF10: 2
PAINLEVEL_OUTOF10: 1

## 2020-08-16 NOTE — FLOWSHEET NOTE
Postpartum and infant care teaching completed and forms signed by patient. Copy witnessed by RN and given to patient. Patient verbalized understanding of all teaching points. Patient denies wanting any discharge prescriptions and states she will notify Dr. Zaheer Perdue if she needs any narcotic pain relief. Patient plans to follow-up with [facility] Provider as instructed. Patient verbalizes understanding of discharge instructions and denies further questions. ID bands checked. Mother's ID band and one of baby's ID bands removed and taped to footprint sheet, signed by patient and witnessed by RN. Patient discharged in stable condition accompanied by family/guardian. Discharged in wheelchair, holding baby in arms.

## 2020-08-16 NOTE — PLAN OF CARE
Problem: Discharge Planning:  Goal: Discharged to appropriate level of care  Description: Discharged to appropriate level of care  Outcome: Completed     Problem: Fluid Volume - Imbalance:  Goal: Absence of postpartum hemorrhage signs and symptoms  Description: Absence of postpartum hemorrhage signs and symptoms  Outcome: Completed  Goal: Absence of imbalanced fluid volume signs and symptoms  Description: Absence of imbalanced fluid volume signs and symptoms  Outcome: Completed     Problem: Infection - Surgical Site:  Goal: Will show no infection signs and symptoms  Description: Will show no infection signs and symptoms  Outcome: Completed     Problem: Mood - Altered:  Goal: Mood stable  Description: Mood stable  Outcome: Completed     Problem: Nausea/Vomiting:  Goal: Absence of nausea/vomiting  Description: Absence of nausea/vomiting  Outcome: Completed     Problem: Pain - Acute:  Goal: Pain level will decrease  Description: Pain level will decrease  Outcome: Completed     Problem: Urinary Retention:  Goal: Urinary elimination within specified parameters  Description: Urinary elimination within specified parameters  Outcome: Completed     Problem: Venous Thromboembolism:  Goal: Will show no signs or symptoms of venous thromboembolism  Description: Will show no signs or symptoms of venous thromboembolism  Outcome: Completed  Goal: Absence of signs or symptoms of impaired coagulation  Description: Absence of signs or symptoms of impaired coagulation  Outcome: Completed     Problem: Pain:  Goal: Pain level will decrease  Description: Pain level will decrease  Outcome: Completed  Goal: Control of acute pain  Description: Control of acute pain  Outcome: Completed  Goal: Control of chronic pain  Description: Control of chronic pain  Outcome: Completed

## 2020-08-18 ENCOUNTER — FOLLOWUP TELEPHONE ENCOUNTER (OUTPATIENT)
Dept: OBGYN | Age: 30
End: 2020-08-18

## 2020-08-18 NOTE — TELEPHONE ENCOUNTER
As you are pregnant or have recently had a baby, we would like to know how you are feeling. Please bold the answer that comes closet to how you have felt in THE PAST 7 DAYS, not just how you feel today. IN THE PAST 7 DAYS. 1. I have been able to laugh and see the funny side of things  Much as I always could  Not quite so much now  Definitely not so much now  Not at all  2. I have looked forward with enjoyment to things    As much as I ever did              Rather less than I used to   Definitely less than I used to   Hardly at all  3. I have blamed myself unnecessarily when things went wrong  Yes most of the time   Yes some of the time   Not very often   No, never  4. I have been anxious or worried for no good reason  No not at all   Hardly ever   Yes sometimes   Yes very often  5. I have felt scared or panicky for no good reason   Yes quite a lot   Yes sometimes   No not much   No not at all  6. Things have been getting me on top of me   Yes most of the time I haven't been able to cope at all. Yes,sometimes I haven't been coping as well as usual.   No, most of the time I have coped quite well   No I have been coping as well as ever  7. I have been so unhappy that I have difficulty sleeping    Yes most of the time   Yes sometimes   Not very often   No not at all  8. I have felt sad or miserable   Yes most of the time   Yes quite often   Not very often   No not at all  9. I have been so unhappy that I have been crying   Yes most of the time   Yes quite often   Only occasionally   No never  10. The thought of harming myself has occurred to me   Yes quite often   Sometimes   Hardly ever   Never     The patient scored a 6 on the [place] Depression scale screening and requires no further follow up at this time.

## (undated) DEVICE — TRAY URIN CATH 16FR DRNGE BG STATLOK STBL DEV F SURSTP

## (undated) DEVICE — CHLORAPREP 26ML ORANGE

## (undated) DEVICE — 9165 UNIVERSAL PATIENT PLATE: Brand: 3M™

## (undated) DEVICE — SUTURE VCRL SZ 0 L36IN ABSRB UD L36MM CT-1 1/2 CIR J946H

## (undated) DEVICE — GLOVE SURG SZ 65 THK91MIL LTX FREE SYN POLYISOPRENE

## (undated) DEVICE — 50" SINGLE PATIENT USE HOVERMATT: Brand: SINGLE PATIENT USE HOVERMATT

## (undated) DEVICE — WOUND RETRACTOR AND PROTECTOR: Brand: ALEXIS WOUND PROTECTOR-RETRACTOR

## (undated) DEVICE — PAD DRESSING TELFA OUCHLESS NONADH 3X8IN

## (undated) DEVICE — PACK PROCEDURE SURG C SECT REV 1

## (undated) DEVICE — SUTURE VCRL SZ 2-0 L36IN ABSRB UD L36MM CT-1 1/2 CIR J945H

## (undated) DEVICE — PENCIL SMK EVAC L10FT TBNG NONSTICK ESU BLDE PLUMEPEN ELITE

## (undated) DEVICE — SUTURE VCRL SZ 3-0 L27IN ABSRB UD L36MM CT-1 1/2 CIR J258H

## (undated) DEVICE — SUTURE PLN GUT SZ 3-0 L27IN ABSRB YELLOWISH TAN L36MM CT-1 842H

## (undated) DEVICE — SUTURE MCRYL SZ 0 L36IN ABSRB VLT L48MM CTX 1/2 CIR Y398H

## (undated) DEVICE — 3M™ STERI-STRIP™ COMPOUND BENZOIN TINCTURE 40 BAGS/CARTON 4 CARTONS/CASE C1544: Brand: 3M™ STERI-STRIP™

## (undated) DEVICE — Z CONVERTED USE 2275207 CLOTH PREP W7.5XL7.5IN 2% CHG SKIN ALC AND RNS FREE

## (undated) DEVICE — SUTURE VCRL SZ 4-0 L27IN ABSRB UD L60MM KS STR REV CUT NDL J662H

## (undated) DEVICE — 3M™ STERI-STRIP™ REINFORCED ADHESIVE SKIN CLOSURES, R1549, 1/2 IN X 2 IN (12 MM X 50 MM), 6 STRIPS/ENVELOPE: Brand: 3M™ STERI-STRIP™

## (undated) DEVICE — BLADE CLIPPER GEN PURP NS

## (undated) DEVICE — GARMENT,MEDLINE,DVT,INT,CALF,MED, GEN2: Brand: MEDLINE